# Patient Record
(demographics unavailable — no encounter records)

---

## 2025-03-07 NOTE — HISTORY OF PRESENT ILLNESS
[FreeTextEntry1] : Nicole   is a pleasant 13 yo male who came today to my office with his mom for evaluation of  left  distal radius fracture. He fell down on 02/27/25 while playing soccer and injured his left  wrist. He immediate experienced pain with any attempt of touching or moving his wrist They went to . Xray was done and undisplaced distal radius fracture was diagnosed. He was placed in a  wrist immobilizer and  was instructed to follow with Peds Ortho. He came today for further management of the same , doing better . Denies any radiating pain, tingling, numbness in his fingers

## 2025-03-07 NOTE — PHYSICAL EXAM
[FreeTextEntry1] : General: Patient is awake and alert and in no acute distress . oriented to person, place. well developed, well nourished, cooperative.   Skin: The skin is intact, warm, pink, and dry over the area examined.    Eyes: normal conjunctiva, normal eyelids and pupils were equal and round.   ENT: normal ears, normal nose and normal lips.  Cardiovascular: There is brisk capillary refill in the digits of the affected extremity. They are symmetric pulses in the bilateral upper and lower extremities, positive peripheral pulses, brisk capillary refill, but no peripheral edema.  Respiratory: The patient is in no apparent respiratory distress. They're taking full deep breaths without use of accessory muscles or evidence of audible wheezes or stridor without the use of a stethoscope, normal respiratory effort.   Neurological: 5/5 motor strength in the main muscle groups of bilateral lower extremities, sensory intact in bilateral lower extremities.   Musculoskeletal: normal gait for age. good posture. normal clinical alignment in upper and lower extremities. full range of motion in bilateral upper and lower extremities. normal clinical alignment of the spine. Left wrist  :  upon removal the splint, no gross deformity. mild swelling around the wrist, mild tenderness to palpation on distal radius.  NV intact. moves all his fingers, sensation intact, normal capillary refill.

## 2025-03-07 NOTE — ASSESSMENT
[FreeTextEntry1] : 11 yo male with left wrist distal raiud buckle frcature DOI 02/27/25 Today's visit included obtaining history from the child  parent due to the child's age, the child could not be considered a reliable historian, requiring parent to act as independent historian. Xray was reviewed today   and Long discussion was done with family regarding  diagnosis, treatment options and prognosis  He will remain in a wrist immobilizer  for 2 more weeks  recommendations: Brace  care was discussed Brace  for  2 more  weeks pain medication as needed Follow up in 2 week for reevaluation  Restriction from activities for  2 weeks. note was provided. This plan was discussed with family. Family verbalizes understanding and agreement of plan. All questions and concerns were addressed today.

## 2025-03-07 NOTE — DATA REVIEWED
[de-identified] : Left wrist  radiographs were  independently reviewed during today's visit  03/07/25   buckle  fracture distal radius . Bones are in normal alignment. Joint spaces are preserved

## 2025-03-07 NOTE — REVIEW OF SYSTEMS
[Joint Swelling] : joint swelling  [Change in Activity] : no change in activity [Fever Above 102] : no fever [Itching] : no itching [Redness] : no redness [Wheezing] : no wheezing [Vomiting] : no vomiting [Limping] : no limping [Joint Pains] : no arthralgias